# Patient Record
Sex: FEMALE | Race: WHITE | Employment: UNEMPLOYED | URBAN - METROPOLITAN AREA
[De-identification: names, ages, dates, MRNs, and addresses within clinical notes are randomized per-mention and may not be internally consistent; named-entity substitution may affect disease eponyms.]

---

## 2022-12-06 ENCOUNTER — ANESTHESIA EVENT (OUTPATIENT)
Dept: SURGERY | Age: 57
End: 2022-12-06
Payer: SELF-PAY

## 2022-12-07 ENCOUNTER — HOSPITAL ENCOUNTER (OUTPATIENT)
Age: 57
Setting detail: OUTPATIENT SURGERY
Discharge: HOME | End: 2022-12-07
Attending: OTOLARYNGOLOGY | Admitting: OTOLARYNGOLOGY
Payer: SELF-PAY

## 2022-12-07 ENCOUNTER — ANESTHESIA (OUTPATIENT)
Dept: SURGERY | Age: 57
End: 2022-12-07
Payer: SELF-PAY

## 2022-12-07 VITALS
HEIGHT: 63 IN | WEIGHT: 152.56 LBS | BODY MASS INDEX: 27.03 KG/M2 | OXYGEN SATURATION: 92 % | HEART RATE: 84 BPM | TEMPERATURE: 97.9 F | SYSTOLIC BLOOD PRESSURE: 144 MMHG | DIASTOLIC BLOOD PRESSURE: 75 MMHG | RESPIRATION RATE: 14 BRPM

## 2022-12-07 PROCEDURE — 77030002966 HC SUT PDS J&J -A: Performed by: OTOLARYNGOLOGY

## 2022-12-07 PROCEDURE — 77030040361 HC SLV COMPR DVT MDII -B

## 2022-12-07 PROCEDURE — 74011000250 HC RX REV CODE- 250: Performed by: OTOLARYNGOLOGY

## 2022-12-07 PROCEDURE — 74011250636 HC RX REV CODE- 250/636: Performed by: OTOLARYNGOLOGY

## 2022-12-07 PROCEDURE — 77030013629 HC ELECTRD NDL STRY -B: Performed by: OTOLARYNGOLOGY

## 2022-12-07 PROCEDURE — 2709999900 HC NON-CHARGEABLE SUPPLY: Performed by: OTOLARYNGOLOGY

## 2022-12-07 PROCEDURE — 74011000272 HC RX REV CODE- 272: Performed by: OTOLARYNGOLOGY

## 2022-12-07 PROCEDURE — 74011000250 HC RX REV CODE- 250: Performed by: NURSE ANESTHETIST, CERTIFIED REGISTERED

## 2022-12-07 PROCEDURE — 77030002974 HC SUT PLN J&J -A: Performed by: OTOLARYNGOLOGY

## 2022-12-07 PROCEDURE — 77030040356 HC CORD BPLR FRCP COVD -A: Performed by: OTOLARYNGOLOGY

## 2022-12-07 PROCEDURE — 74011250636 HC RX REV CODE- 250/636: Performed by: NURSE ANESTHETIST, CERTIFIED REGISTERED

## 2022-12-07 PROCEDURE — 76010000133 HC OR TIME 3 TO 3.5 HR: Performed by: OTOLARYNGOLOGY

## 2022-12-07 PROCEDURE — 76060000037 HC ANESTHESIA 3 TO 3.5 HR: Performed by: OTOLARYNGOLOGY

## 2022-12-07 PROCEDURE — 77030008771 HC TU NG SALEM SUMP -A: Performed by: NURSE ANESTHETIST, CERTIFIED REGISTERED

## 2022-12-07 PROCEDURE — 74011250637 HC RX REV CODE- 250/637: Performed by: OTOLARYNGOLOGY

## 2022-12-07 PROCEDURE — 77030026438 HC STYL ET INTUB CARD -A: Performed by: NURSE ANESTHETIST, CERTIFIED REGISTERED

## 2022-12-07 PROCEDURE — 76210000016 HC OR PH I REC 1 TO 1.5 HR: Performed by: OTOLARYNGOLOGY

## 2022-12-07 PROCEDURE — 74011250636 HC RX REV CODE- 250/636: Performed by: ANESTHESIOLOGY

## 2022-12-07 PROCEDURE — 76210000020 HC REC RM PH II FIRST 0.5 HR: Performed by: OTOLARYNGOLOGY

## 2022-12-07 PROCEDURE — 77030008684 HC TU ET CUF COVD -B: Performed by: NURSE ANESTHETIST, CERTIFIED REGISTERED

## 2022-12-07 PROCEDURE — 77030040922 HC BLNKT HYPOTHRM STRY -A

## 2022-12-07 RX ORDER — EPHEDRINE SULFATE/0.9% NACL/PF 50 MG/5 ML
SYRINGE (ML) INTRAVENOUS AS NEEDED
Status: DISCONTINUED | OUTPATIENT
Start: 2022-12-07 | End: 2022-12-07 | Stop reason: HOSPADM

## 2022-12-07 RX ORDER — SODIUM CHLORIDE, SODIUM LACTATE, POTASSIUM CHLORIDE, CALCIUM CHLORIDE 600; 310; 30; 20 MG/100ML; MG/100ML; MG/100ML; MG/100ML
100 INJECTION, SOLUTION INTRAVENOUS CONTINUOUS
Status: DISCONTINUED | OUTPATIENT
Start: 2022-12-07 | End: 2022-12-07 | Stop reason: HOSPADM

## 2022-12-07 RX ORDER — ONDANSETRON 2 MG/ML
INJECTION INTRAMUSCULAR; INTRAVENOUS AS NEEDED
Status: DISCONTINUED | OUTPATIENT
Start: 2022-12-07 | End: 2022-12-07 | Stop reason: HOSPADM

## 2022-12-07 RX ORDER — SODIUM CHLORIDE, SODIUM LACTATE, POTASSIUM CHLORIDE, CALCIUM CHLORIDE 600; 310; 30; 20 MG/100ML; MG/100ML; MG/100ML; MG/100ML
125 INJECTION, SOLUTION INTRAVENOUS CONTINUOUS
Status: DISCONTINUED | OUTPATIENT
Start: 2022-12-07 | End: 2022-12-07 | Stop reason: HOSPADM

## 2022-12-07 RX ORDER — FLUMAZENIL 0.1 MG/ML
0.2 INJECTION INTRAVENOUS
Status: DISCONTINUED | OUTPATIENT
Start: 2022-12-07 | End: 2022-12-07 | Stop reason: HOSPADM

## 2022-12-07 RX ORDER — PROPOFOL 10 MG/ML
INJECTION, EMULSION INTRAVENOUS
Status: DISCONTINUED | OUTPATIENT
Start: 2022-12-07 | End: 2022-12-07 | Stop reason: HOSPADM

## 2022-12-07 RX ORDER — ASCORBIC ACID 500 MG
TABLET ORAL
COMMUNITY

## 2022-12-07 RX ORDER — OXYMETAZOLINE HCL 0.05 %
SPRAY, NON-AEROSOL (ML) NASAL AS NEEDED
Status: DISCONTINUED | OUTPATIENT
Start: 2022-12-07 | End: 2022-12-07 | Stop reason: HOSPADM

## 2022-12-07 RX ORDER — SCOLOPAMINE TRANSDERMAL SYSTEM 1 MG/1
1 PATCH, EXTENDED RELEASE TRANSDERMAL ONCE
Status: DISCONTINUED | OUTPATIENT
Start: 2022-12-07 | End: 2022-12-07 | Stop reason: HOSPADM

## 2022-12-07 RX ORDER — DEXAMETHASONE SODIUM PHOSPHATE 4 MG/ML
INJECTION, SOLUTION INTRA-ARTICULAR; INTRALESIONAL; INTRAMUSCULAR; INTRAVENOUS; SOFT TISSUE AS NEEDED
Status: DISCONTINUED | OUTPATIENT
Start: 2022-12-07 | End: 2022-12-07 | Stop reason: HOSPADM

## 2022-12-07 RX ORDER — DEXAMETHASONE SODIUM PHOSPHATE 4 MG/ML
10 INJECTION, SOLUTION INTRA-ARTICULAR; INTRALESIONAL; INTRAMUSCULAR; INTRAVENOUS; SOFT TISSUE ONCE
Status: DISCONTINUED | OUTPATIENT
Start: 2022-12-07 | End: 2022-12-07 | Stop reason: HOSPADM

## 2022-12-07 RX ORDER — ROCURONIUM BROMIDE 10 MG/ML
INJECTION, SOLUTION INTRAVENOUS AS NEEDED
Status: DISCONTINUED | OUTPATIENT
Start: 2022-12-07 | End: 2022-12-07 | Stop reason: HOSPADM

## 2022-12-07 RX ORDER — BACITRACIN ZINC 500 UNIT/G
OINTMENT (GRAM) TOPICAL AS NEEDED
Status: DISCONTINUED | OUTPATIENT
Start: 2022-12-07 | End: 2022-12-07 | Stop reason: HOSPADM

## 2022-12-07 RX ORDER — ONDANSETRON 2 MG/ML
4 INJECTION INTRAMUSCULAR; INTRAVENOUS AS NEEDED
Status: DISCONTINUED | OUTPATIENT
Start: 2022-12-07 | End: 2022-12-07 | Stop reason: HOSPADM

## 2022-12-07 RX ORDER — LIDOCAINE HYDROCHLORIDE 10 MG/ML
0.1 INJECTION, SOLUTION EPIDURAL; INFILTRATION; INTRACAUDAL; PERINEURAL AS NEEDED
Status: DISCONTINUED | OUTPATIENT
Start: 2022-12-07 | End: 2022-12-07 | Stop reason: HOSPADM

## 2022-12-07 RX ORDER — HYDROMORPHONE HYDROCHLORIDE 2 MG/ML
INJECTION, SOLUTION INTRAMUSCULAR; INTRAVENOUS; SUBCUTANEOUS AS NEEDED
Status: DISCONTINUED | OUTPATIENT
Start: 2022-12-07 | End: 2022-12-07 | Stop reason: HOSPADM

## 2022-12-07 RX ORDER — SODIUM CHLORIDE 0.9 % (FLUSH) 0.9 %
5-40 SYRINGE (ML) INJECTION EVERY 8 HOURS
Status: DISCONTINUED | OUTPATIENT
Start: 2022-12-07 | End: 2022-12-07 | Stop reason: HOSPADM

## 2022-12-07 RX ORDER — SODIUM CHLORIDE 0.9 % (FLUSH) 0.9 %
5-40 SYRINGE (ML) INJECTION AS NEEDED
Status: DISCONTINUED | OUTPATIENT
Start: 2022-12-07 | End: 2022-12-07 | Stop reason: HOSPADM

## 2022-12-07 RX ORDER — OXYMETAZOLINE HCL 0.05 %
2 SPRAY, NON-AEROSOL (ML) NASAL ONCE
Status: COMPLETED | OUTPATIENT
Start: 2022-12-07 | End: 2022-12-07

## 2022-12-07 RX ORDER — LIDOCAINE HYDROCHLORIDE AND EPINEPHRINE 20; 10 MG/ML; UG/ML
INJECTION, SOLUTION INFILTRATION; PERINEURAL AS NEEDED
Status: DISCONTINUED | OUTPATIENT
Start: 2022-12-07 | End: 2022-12-07 | Stop reason: HOSPADM

## 2022-12-07 RX ORDER — LIDOCAINE HYDROCHLORIDE 20 MG/ML
INJECTION, SOLUTION EPIDURAL; INFILTRATION; INTRACAUDAL; PERINEURAL AS NEEDED
Status: DISCONTINUED | OUTPATIENT
Start: 2022-12-07 | End: 2022-12-07 | Stop reason: HOSPADM

## 2022-12-07 RX ORDER — SUCCINYLCHOLINE CHLORIDE 20 MG/ML
INJECTION INTRAMUSCULAR; INTRAVENOUS AS NEEDED
Status: DISCONTINUED | OUTPATIENT
Start: 2022-12-07 | End: 2022-12-07 | Stop reason: HOSPADM

## 2022-12-07 RX ORDER — LISINOPRIL AND HYDROCHLOROTHIAZIDE 10; 12.5 MG/1; MG/1
1 TABLET ORAL DAILY
COMMUNITY

## 2022-12-07 RX ORDER — MIDAZOLAM HYDROCHLORIDE 1 MG/ML
INJECTION, SOLUTION INTRAMUSCULAR; INTRAVENOUS AS NEEDED
Status: DISCONTINUED | OUTPATIENT
Start: 2022-12-07 | End: 2022-12-07 | Stop reason: HOSPADM

## 2022-12-07 RX ORDER — NEOSTIGMINE METHYLSULFATE 1 MG/ML
INJECTION, SOLUTION INTRAVENOUS AS NEEDED
Status: DISCONTINUED | OUTPATIENT
Start: 2022-12-07 | End: 2022-12-07 | Stop reason: HOSPADM

## 2022-12-07 RX ORDER — HYDROMORPHONE HYDROCHLORIDE 1 MG/ML
.5-1 INJECTION, SOLUTION INTRAMUSCULAR; INTRAVENOUS; SUBCUTANEOUS
Status: DISCONTINUED | OUTPATIENT
Start: 2022-12-07 | End: 2022-12-07 | Stop reason: HOSPADM

## 2022-12-07 RX ORDER — PROPOFOL 10 MG/ML
INJECTION, EMULSION INTRAVENOUS AS NEEDED
Status: DISCONTINUED | OUTPATIENT
Start: 2022-12-07 | End: 2022-12-07 | Stop reason: HOSPADM

## 2022-12-07 RX ORDER — NALOXONE HYDROCHLORIDE 0.4 MG/ML
0.2 INJECTION, SOLUTION INTRAMUSCULAR; INTRAVENOUS; SUBCUTANEOUS
Status: DISCONTINUED | OUTPATIENT
Start: 2022-12-07 | End: 2022-12-07 | Stop reason: HOSPADM

## 2022-12-07 RX ORDER — ROSUVASTATIN CALCIUM 10 MG/1
10 TABLET, COATED ORAL
COMMUNITY

## 2022-12-07 RX ORDER — UREA 10 %
100 LOTION (ML) TOPICAL DAILY
COMMUNITY

## 2022-12-07 RX ORDER — GLYCOPYRROLATE 0.2 MG/ML
INJECTION INTRAMUSCULAR; INTRAVENOUS AS NEEDED
Status: DISCONTINUED | OUTPATIENT
Start: 2022-12-07 | End: 2022-12-07 | Stop reason: HOSPADM

## 2022-12-07 RX ADMIN — PROPOFOL 50 MG: 10 INJECTION, EMULSION INTRAVENOUS at 09:48

## 2022-12-07 RX ADMIN — ROCURONIUM BROMIDE 10 MG: 10 INJECTION INTRAVENOUS at 07:42

## 2022-12-07 RX ADMIN — GLYCOPYRROLATE 0.4 MG: 0.2 INJECTION INTRAMUSCULAR; INTRAVENOUS at 10:18

## 2022-12-07 RX ADMIN — DEXAMETHASONE SODIUM PHOSPHATE 10 MG: 4 INJECTION, SOLUTION INTRAMUSCULAR; INTRAVENOUS at 07:51

## 2022-12-07 RX ADMIN — PROPOFOL 150 MG: 10 INJECTION, EMULSION INTRAVENOUS at 07:37

## 2022-12-07 RX ADMIN — HYDROMORPHONE HYDROCHLORIDE 1 MG: 2 INJECTION, SOLUTION INTRAMUSCULAR; INTRAVENOUS; SUBCUTANEOUS at 07:27

## 2022-12-07 RX ADMIN — SODIUM CHLORIDE, POTASSIUM CHLORIDE, SODIUM LACTATE AND CALCIUM CHLORIDE 125 ML/HR: 600; 310; 30; 20 INJECTION, SOLUTION INTRAVENOUS at 07:08

## 2022-12-07 RX ADMIN — CEFAZOLIN SODIUM 2 G: 1 POWDER, FOR SOLUTION INTRAMUSCULAR; INTRAVENOUS at 07:51

## 2022-12-07 RX ADMIN — ROCURONIUM BROMIDE 10 MG: 10 INJECTION INTRAVENOUS at 08:31

## 2022-12-07 RX ADMIN — PROPOFOL 100 MG: 10 INJECTION, EMULSION INTRAVENOUS at 08:31

## 2022-12-07 RX ADMIN — ROCURONIUM BROMIDE 10 MG: 10 INJECTION INTRAVENOUS at 09:48

## 2022-12-07 RX ADMIN — PROPOFOL 175 MCG/KG/MIN: 10 INJECTION, EMULSION INTRAVENOUS at 07:39

## 2022-12-07 RX ADMIN — Medication 5 MG: at 08:50

## 2022-12-07 RX ADMIN — Medication 2 SPRAY: at 07:10

## 2022-12-07 RX ADMIN — LIDOCAINE HYDROCHLORIDE 60 MG: 20 INJECTION, SOLUTION EPIDURAL; INFILTRATION; INTRACAUDAL; PERINEURAL at 07:37

## 2022-12-07 RX ADMIN — MIDAZOLAM HYDROCHLORIDE 2 MG: 1 INJECTION, SOLUTION INTRAMUSCULAR; INTRAVENOUS at 07:27

## 2022-12-07 RX ADMIN — ROCURONIUM BROMIDE 15 MG: 10 INJECTION INTRAVENOUS at 07:51

## 2022-12-07 RX ADMIN — SUCCINYLCHOLINE CHLORIDE 100 MG: 20 INJECTION, SOLUTION INTRAMUSCULAR; INTRAVENOUS at 07:37

## 2022-12-07 RX ADMIN — ONDANSETRON HYDROCHLORIDE 4 MG: 2 SOLUTION INTRAMUSCULAR; INTRAVENOUS at 07:54

## 2022-12-07 RX ADMIN — NEOSTIGMINE METHYLSULFATE 3 MG: 1 INJECTION, SOLUTION INTRAVENOUS at 10:18

## 2022-12-07 RX ADMIN — ROCURONIUM BROMIDE 5 MG: 10 INJECTION INTRAVENOUS at 07:37

## 2022-12-07 NOTE — ANESTHESIA PREPROCEDURE EVALUATION
Relevant Problems   No relevant active problems       Anesthetic History   No history of anesthetic complications            Review of Systems / Medical History  Patient summary reviewed and pertinent labs reviewed    Pulmonary  Within defined limits                 Neuro/Psych         Psychiatric history     Cardiovascular    Hypertension: well controlled          Hyperlipidemia    Exercise tolerance: >4 METS     GI/Hepatic/Renal  Within defined limits              Endo/Other  Within defined limits           Other Findings              Physical Exam    Airway  Mallampati: II  TM Distance: 4 - 6 cm  Neck ROM: normal range of motion   Mouth opening: Normal     Cardiovascular    Rhythm: regular  Rate: normal         Dental    Dentition: Upper dentition intact and Lower dentition intact     Pulmonary  Breath sounds clear to auscultation               Abdominal         Other Findings            Anesthetic Plan    ASA: 2  Anesthesia type: general          Induction: Intravenous  Anesthetic plan and risks discussed with: Patient

## 2022-12-07 NOTE — H&P
Otolaryngology, Head and Neck Surgery    Chief Complaint:    History of Present Illness:   Patient is a 59 y.o. female who is being seen for rhinoplasty. No h/o nasal surgery. C/o tip roundness and alar base width. Past Medical History:   Diagnosis Date    Depression     Hypercholesterolemia       History reviewed. No pertinent family history. Social History     Tobacco Use    Smoking status: Not on file    Smokeless tobacco: Not on file   Substance Use Topics    Alcohol use: Not on file     Past Surgical History:   Procedure Laterality Date    HX MASTOPEXY (BREAST LIFT) Bilateral     HX WISDOM TEETH EXTRACTION Bilateral       Current Facility-Administered Medications   Medication Dose Route Frequency    lidocaine (PF) (XYLOCAINE) 10 mg/mL (1 %) injection 0.1 mL  0.1 mL SubCUTAneous PRN    lactated Ringers infusion  100 mL/hr IntraVENous CONTINUOUS    sodium chloride (NS) flush 5-40 mL  5-40 mL IntraVENous Q8H    sodium chloride (NS) flush 5-40 mL  5-40 mL IntraVENous PRN    naloxone (NARCAN) injection 0.2 mg  0.2 mg IntraVENous Multiple    flumazeniL (ROMAZICON) 0.1 mg/mL injection 0.2 mg  0.2 mg IntraVENous Multiple    lactated Ringers infusion  125 mL/hr IntraVENous CONTINUOUS    lidocaine (PF) (XYLOCAINE) 10 mg/mL (1 %) injection 0.1 mL  0.1 mL SubCUTAneous PRN    ceFAZolin (ANCEF) 2 g in sterile water (preservative free) 20 mL IV syringe  2 g IntraVENous ONCE    dexamethasone (DECADRON) 4 mg/mL injection 10 mg  10 mg IntraVENous ONCE    scopolamine (TRANSDERM-SCOP) 1 mg over 3 days 1 Patch  1 Patch TransDERmal ONCE      Allergies   Allergen Reactions    Erythromycin Hives        Review of Systems:  Pertinent items are noted in the History of Present Illness.     Objective:     Patient Vitals for the past 8 hrs:   BP Temp Pulse Resp SpO2 Height Weight   22 0639 (!) 145/78 98.5 °F (36.9 °C) 62 16 96 % 5' 3\" (1.6 m) 69.2 kg (152 lb 8.9 oz)     Temp (24hrs), Av.5 °F (36.9 °C), Min:98.5 °F (36.9 °C), Max:98.5 °F (36.9 °C)    No intake/output data recorded. PHYSICAL EXAM:    CONSTITUTIONAL:  Well nourished individual in no acute distress. The patient is able to communicate with normal voice quality. HEAD AND NECK EXAM:    HEAD & FACE: No head or facial abnormalities present. No masses or lesions present. Overall appearance is normal. Facial strength is normal.  EYES: Conjunctiva normal.  No abnormalities of the lids or globes. Extraocular movements intact and conjugate. EARS: No significant abnormality of the external ear. NOSE: No significant external nasal lesions. No turbinate hypertrophy or mucosal lesions. No polyps, masses or purulence seen anteriorly. No edema. No significant septal deviation. SINUSES: The paranasal sinus regions are non-tender to palpation. ORAL CAVITY/OROPHARYNX: Lips and gums are without lesions. Oral cavity and oropharynx are without mucosal lesions or abnormalities. Tonsillar fossae, palate, tongue and uvula without abnormality. No edema. No erythema. NECK: No palpable lymphadenopathy or other masses in the neck. The trachea and larynx are midline. No thyroid enlargement or nodules appreciated. No abnormality of the parotid or submandibular glands present. LYMPHATIC: No lymphadenopathy in the neck/head. CHEST:  CTA  HEART:  RRR  NEUROLOGIC/PSYCHIATRIC:    NEUROLOGIC:  Cranial nerves 3, 4, 5, 6, 7, 10 (soft palate elevation), 11 and 12 bilaterally intact and symmetric. ORIENTATION/MOOD/AFFECT: Oriented to person, place, time and general circumstances. Mood and affect appropriate. Assessment:     Nasal disproportion    Plan:     Ready to proceed with rhinoplasty. Questions answered. Consent signed. Signed By: David Ramirez MD     December 7, 2022       [facility] HospitalDaniela Scanlon MD, [facility], [address] [facility] Facial [facility] Surgery  [email or web address]  [email or web address]  [address] [place] [address], [address] [place] [address], [address] [facility]  Ph:  [phone]  Fax: [phone]

## 2022-12-07 NOTE — DISCHARGE INSTRUCTIONS
Patient Discharge Instructions    Naveed Sababrenda Blackburn / [id number] : 1965    Admitted 2022 Discharged: 2022            Rhinoplasty Postoperative Instructions        The following instructions have been designed to answer practically every question that might arise regarding the \"do's\" and \"don'ts\" after rhinoplasty surgery. You and your family should read these several times to become familiar with them. Follow them faithfully because those who do generally have the smoothest postoperative course. SWELLING    Every operation, no matter how minor, is accompanied by swelling and sometimes bruising of the surrounding tissues. The amount varies from person to person. The swelling and bruising itself is not serious and is to be expected after your surgery. It sometimes is worse on the second postoperative day than it was on the first, and in the mornings. Remember that swelling and bruising will always subside eventually. You can help decrease the swelling in the following ways: Ice the nose for the first 48-72 hours after surgery. Ice placed inside a glove, frozen peas, or cold gel compresses can be used 20 minutes on, 20 minutes off while awake. Placing gauze over the skin under the ice can make it more comfortable. Avoid getting the tape dressing wet. Sleep with your head elevated for at least 1 week after surgery. Keep the head above the heart is sufficient to encourage drainage of swelling away from the nose. Avoid staying in bed after surgery. Moving around / light activity encourages circulation. Avoid bending over or lifting heavy things for one week. Besides aggravating the swelling, this may raise your blood pressure and cause bleeding. Avoid hitting or bumping your new nose. It is wise not to  small children. Avoid exposure to excessive sun during the 6 weeks after your operation. A sunscreen is always advisable regardless.   Use of sunscreen containing zinc or titanium oxide is encouraged. Do not tweeze your eyebrows for one week. This could cause infection. Avoid \"sniffing\", that is, constantly attempting to pull air through the nose as some people do when their nose feels blocked. This will not relieve the sensation of blockage - it will only aggravate it because the suction created on the inside will cause more swelling. However, gently breathing through the nose is fine and encouraged. Avoid rubbing the nostrils and the base of the nose with tissues or a handkerchief. Not only will this aggravate the swelling, but also it may cause infection, bleeding, or the accumulation of scar tissue inside the nose. Use the \"moustache\" gauze dressing instead if discharge is excessive. DISCOLORATION    It is not unusual to have varying amounts of bruising/discoloration in the cheeks and even beyond the face. Like swelling, the discoloration may become more pronounced a few days after surgery. It usually lasts not more than a week or two, all the while decreasing in intensity. If the nasal bones were not reshaped, there is usually very little bruising. Early use of icing/cold compresses is certainly recommended. Use of Arnica or Bromelain supplement before and after surgery (usually provided by the office) can help prevent and reduce swelling and bruising. Arnica gel or cream can be applied directly to bruises under the eyes as well. NUMBNESS    After surgery you will notice that the tip of your nose feels firm, and it is not uncommon for the nose to feel numb for a short time. Incisions inside your nose may feel slightly irregular on their surface until all swelling disappears. NASAL PACKING AND BLEEDING    The nose is not typically packed after surgery. Therefore, it is not uncommon to soak several gauze pads (your moustache dressing) during the first several hours after surgery. The frequency with which these are changed should decrease. Change the drip pad as needed using 4x4 gauze and tape. Whenever the nasal passages are blocked, such as when you have a cold or an allergy, the nasal glands produce more mucous than normal.  Your nose will be partially blocked from the postoperative swelling, so you can expect an increase in mucous drainage for several days. It may be blood tinged, which is expected. If a turbinate resection was part of your nasal procedure, bleeding can occur from this area for up to six weeks after your surgery. Be diligent in using the nasal saline spray and ointment. This helps the healing process and the dissolving of the crusts that form on the turbinates. PAIN    There is usually little actual pain following nasal surgery. You will be prescribed pain medication to take as needed. If the pain is mild, Tylenol can be used alone. Watch your total dose of Tylenol since the prescription pain medication typically contains Tylenol (Acetaminophen). The prescribed pain medication often causes sensations of light-headedness, particularly in the immediate postoperative period. Please take the pain medicine as needed. Do not try to \"tough it out\" if you are uncomfortable. DO NOT take aspirin, Motrin (ibuprofen), or any other NSAIDs (Non-Steroidal Anti-Inflammatory Drugs). This can increase your chance of bleeding and bruising. NAUSEA    Sometimes the anesthesia, the pain pills, or swallowed blood will make you nauseated. Nausea medication that dissolves under the tongue will usually be prescribed before surgery. DEPRESSION    It is not unusual for patients to go through a period of mild depression after surgery. Even though you very much want this surgery, and even though we have tried to tell you what to expect postoperatively, you may be somewhat shocked at seeing your own face swollen and bruised. This is a temporary condition which will subside shortly.   The best thing to do is to busy yourself with the details of your postoperative care and try to remember that the recovery period will soon be over. INSOMNIA    You may experience some difficulty falling asleep. If this becomes a significant issue, contact the office. We can prescribe sleeping medication if needed. KEEPING A STIFF UPPER LIP    The upper lip is important in nasal surgery, as much work is done in this area. To keep the healing tissues from being disturbed, avoid excessively moving your upper lip for as long as the bandage is in place. Avoid excessive smiling or pursing the lips such as kissing for ten days. Do not pull the upper lip down as women do when applying lipstick. Apply lipstick with a brush. Be careful with a toothbrush to avoid moving the lip excessively. Avoid gum or foods that are hard to chew. Soft foods may be preferrable for the first week. Take small bites to avoid opening the mouth widely. CLEANING THE NOSE    Don't blow the nose at all for ten days. After that, blow through both sides at once. Do not compress one side. You may clean the outside of the nose and the upper lip with cotton tipped applicators (Q-tips) moistened with warm water or dilute hydrogen peroxide (mix 50/50 with warm water). You can do this as soon as you return from the hospital, but do not rub the nose very hard. CLEANING THE NOSE    Saline (salt water) nose spray and an antibiotic ointment are prescribed to keep the nose moist and open. This will prevent any crusts from forming. Start using these the night of surgery and continue them frequently. The nasal saline spray should be used every few hours while awake, and the ointment should be applied 3-4 times/day. The outside and just inside of the nostrils may need to be cleaned with a Q-tip moistened with warm water or dilute hydrogen peroxide if crusting is present.   The antibiotic ointment that you will be prescribed, usually Bacitracin, should be applied to the inside of the nose with a Q-tip. Twist the Q-tip around inside gently; you can go in about as deep as the cotton part or until you feel any resistance. This will help prevent crusting and help you to breathe better. This should be done at least 3 to 4 times a day. You were also prescribed a saline spray. Using pressurized, misting saline spray, such as Simply Saline, is preferred. Be sure to avoid hypertonic saline spray. This can actually dry the nose more. Soon after the bandage has been removed, the skin on the nose should be cleaned gently in your usual manner twice a day, to remove the oily material that is produced by the skin glands. This will also assist in the reduction of swelling. DRYNESS OF THE LIPS    If your lips become dry from breathing through your mouth, lip balm or Vaseline may be used. A humidifier with plain water by the bedside at night might be a helpful addition. TEMPERATURE    Generally, the body temperature does not rise much above 100 degrees following nasal surgery. This rise usually occurs from slight dehydration so remember to drink plenty of fluids. Report any persistent temperature above 100 degrees. WEAKNESS    It is not unusual for a person who has had an anesthetic or any type of operation to feel weak or dizzy. This gradually clears up in a few days without medication. MEDICATION    Our office will usually prescribe all your prescriptions to be picked up before your surgery. If not, please contact the office. Almost all patients will be prescribed an antibiotic, pain medication, nausea medication, saline spray, and antibiotic ointment to be taken after surgery. Multivitamins with vitamin C are suggested for the pre- and postoperative periods and can be obtained by you without a prescription. The office will usually provide you with Arnica and Bromelain supplements to start before surgery.   These supplements can help prevent/reduce inflammation and bruising. If you develop a rash or other reaction while you are taking one of the medicines, this could mean that you are developing an allergy to the medicine. If this occurs, please stop taking your medications and call the office immediately. YOUR FIRST POSTOPERATIVE OFFICE VISIT    The appointment for your first postop visit should be made prior to surgery. This appointment will most probably be for the day after surgery. Sometimes, the first postoperative visit is made for 5-6 days after surgery. Please call the office if you have any questions before your postoperative visit. POSTOPERATIVE CARE    Following your surgery, we will want to see you in the office at regularly scheduled intervals to monitor your progress. RESUMING ACTIVITIES    While the bandage is in place, avoid wearing any pull over clothing. You should AVOID STRENUOUS ATHLETIC ACTIVITY FOR 3 WEEKS, including jogging, swimming, aerobics, weightlifting, etc.  After 3 weeks, ease back into exercise slowly over the following week. Avoid contact sports for four months. Avoid sneezing until the bandage is removed. If you must sneeze, let it come out like a cough - through the mouth. If it becomes a real problem, we will prescribe medication to alleviate the condition. Eyeglasses may be worn as long as the metal splint remains on the nose. After the splint is removed, glasses must be avoided or otherwise suspended from the forehead for a period of about six weeks. If this is not done, the pressure of your glasses may change the contour of your nose. Your glasses can be suspended from the nose after your splint is removed in three ways. One way is to use a piece of tape to hold the glasses on your forehead so that the weight is off your nose. Another alternative is a \"noseguard (Search [employer] Pro-Nose Guard, For [employer] on [address]).    We can provide you with further information about any of these alternatives, and our nurses will be happy to discuss these with you to help you choose the option that best fits your needs. Contact lenses may be worn the day after surgery. RETURNING TO WORK OR SCHOOL    The average patient can return to work or school the day after the bandage is removed. That will be about five to seven days after your surgery. Returning to work depends on several factors: the amount of physical activity involved in your position, the amount of public interaction your job requires, and the amount of swelling and discoloration that you may develop. Some patients may feel more comfortable staying home from work for 10-14 days after surgery. INJURY TO THE NOSE    Some individuals sustain accidents during the early postoperative period. You need not be too concerned unless the blow is hard enough to cause significant bleeding, swelling, or pain. If a blow is sustained while the metal splint is still on, this should help protect the nose. However, for the first five weeks after the nasal splint is removed, more attention should be paid to any injury to the nose. Blows to the nose can cause the nasal bones to become deviated. Please report any accident to the office immediately if you feel it was a significant bump. Otherwise, let us know about it at your next visit. FINALLY    Remember the things you were told before your operation:    When the bandage is first removed, your nose will appear swollen and may appear turned up too much. This is caused by the operative swelling over the nose and in the upper lip. The swelling will subside to a great extent during the next week. However, remember that it will take up to one or two years for all the swelling to disappear and for your nose to reach its final contour. The discoloration will gradually disappear over a period of seven to ten days, in most cases.   The thicker and oilier the skin, and the more significant the surgical maneuvers required, the longer the swelling will take to subside. The upper lip may seem stiff for some time after surgery, and you may feel that this interferes with your smile. Be patient. This will typically disappear within a few weeks. The tip of the nose sometimes feels numb after nasal surgery. This will eventually disappear. The upper teeth and palate may tingle or be partially numb as well. This, too, will resolve with time. Follow-up with Meeta Carpenter MD Tuesday 12/13/22 2:15pm    If you have any questions, please call us at [phone]. We are always happy to answer your questions, and if you should have a problem, this number is answered 24 hours a day. Sarah Mclean DISCHARGE SUMMARY from your Nurse      PATIENT INSTRUCTIONS    After general anesthesia or intravenous sedation, for 24 hours or while taking prescription Narcotics:  Limit your activities  Do not drive and operate hazardous machinery  Do not make important personal or business decisions  Do  not drink alcoholic beverages  If you have not urinated within 8 hours after discharge, please contact your surgeon on call. Report the following to your surgeon:  Excessive pain, swelling, redness or odor of or around the surgical area  Temperature over 100.5  Nausea and vomiting lasting longer than 4 hours or if unable to take medications  Any signs of decreased circulation or nerve impairment to extremity: change in color, persistent  numbness, tingling, coldness or increase pain  Any questions      GOOD HELP TO FIGHT AN INFECTION  Here are a few tip to help reduce the chance of getting an infection after surgery:  Wash Your Hands  Good handwashing is the most important thing you and your caregiver can do. Wash before and after caring for any wounds. Dry your hand with a clean towel. Wash with soap and water for at least 20 seconds.  A TIP: sing the \"Happy Birthday\" song through one time while washing to help with the timing. Use a hand  in between washings. Shower  When your surgeon says it is OK to take a shower, use a new bar of antibacterial soap (if that is what you use, and keep that bar of soap ONLY for your use), or antibacterial body wash. Use a clean wash cloth or sponge when you bathe. Dry off with a clean towel  after every bath - be careful around any wounds, skin staples, sutures or surgical glue over/on wounds. Do not enter swimming pools, hot tubs, lakes, rivers and/or ocean until wounds are healed and your doctor/surgeon says it is OK. Use Clean Sheets  Sleep on freshly laundered sheets after your surgery. Keep the surgery site covered with a clean, dry bandage (if instructed to do so). If the bandage becomes soiled, reapply a new, dry, clean bandage. Do not allow pets to sleep with you while your wound is healing. Lifestyle Modification and Controlling Your Blood Sugar  Smoking slows wound healing. Stop smoking and limit exposure to second-hand smoke. High blood sugar slows wound healing. Eat a well-balanced diet to provide proper nutrition while healing  Monitor your blood sugar (if you are a diabetic) and take your medications as you are suppose to so you can control you blood sugar after surgery. COUGH AND DEEP BREATHE    Breathing deeply and coughing are very important exercises to do after surgery. Deep breathing and coughing open the little air tubes and air sacks in your lungs. You take deep breaths every day. You may not even notice - it is just something you do when you sigh or yawn. It is a natural exercise you do to keep these air passages open. After surgery, take deep breaths and cough, on purpose. DIRECTIONS:  Take 10 to 15 slow deep breaths every hour while awake. Breathe in deeply, and hold it for 2 seconds. Exhale slowly through puckered lips, like blowing up a balloon.   After every 4th or 5th deep breath, hug your pillow to your chest or belly and give a hard, deep cough. Yes, it will probably hurt. But doing this exercise is a very important part of healing after surgery. Take your pain medicine to help you do this exercise without too much pain. Coughing and deep breathing help prevent bronchitis and pneumonia after surgery. If you had chest or belly surgery, use a pillow as a \"hug toby\" and hold it tightly to your chest or belly when you cough. ANKLE PUMPS    Ankle pumps increase the circulation of oxygenated blood to your lower extremities and decrease your risk for circulation problems such as blood clots. They also stretch the muscles, tendons and ligaments in your foot and ankle, and prevent joint contracture in the ankle and foot, especially after surgeries on the legs. It is important to do ankle pump exercises regularly after surgery because immobility increases your risk for developing a blood clot. Your doctor may also have you take an Aspirin for the next few days as well. If your doctor did not ask you to take an Aspirin, consult with him before starting Aspirin therapy on your own. The exercise is quite simple. Slowly point your foot forward, feeling the muscles on the top of your lower leg stretch, and hold this position for 5 seconds. Next, pull your foot back toward you as far as possible, stretching the calf muscles, and hold that position for 5 seconds. Repeat with the other foot. Perform 10 repetitions every hour while awake for both ankles if possible (down and then up with the foot once is one repetition). You should feel gentle stretching of the muscles in your lower leg when doing this exercise. If you feel pain, or your range of motion is limited, don't push too hard. Only go the limit your joint and muscles will let you go.   If you have increasing pain, progressively worsening leg warmth or swelling, STOP the exercise and call your doctor. MEDICATION AND   SIDE EFFECT GUIDE    The [facility] MEDICATION AND SIDE EFFECT GUIDE was provided to the PATIENT AND CARE PROVIDER. Information provided includes instruction about drug purpose and common side effects for the following medications:   Pain meds, Nausea meds, antibiotics, antibiotic ointment, saline spray        These are general instructions for a healthy lifestyle:    *   Please give a list of your current medications to your Primary Care Provider. *   Please update this list whenever your medications are discontinued, doses are changed, or new medications (including over-the-counter products) are added. *   Please carry medication information at all times in case of emergency situations. About Smoking  No smoking / No tobacco products  Avoid exposure to second hand smoke     Surgeon General's Warning:  Quitting smoking now greatly reduces serious risk to your health. Obesity, smoking, and sedentary lifestyle greatly increases your risk for illness and disease. A healthy diet, regular physical exercise & weight monitoring are important for maintaining a healthy lifestyle. Congestive Heart Failure  You may be retaining fluid if you have a history of heart failure or if you experience any of the following symptoms:  Weight gain of 3 pounds or more overnight or 5 pounds in a week, increased swelling in your hands or feet or shortness of breath while lying flat in bed. Please call your doctor as soon as you notice any of these symptoms; do not wait until your next office visit. Recognize signs and symptoms of STROKE:  F -  Face looks uneven  A -  Arms unable to move or move evenly  S -  Speech slurred or non-existent  T -  Time-call 911 as soon as signs and symptoms begin-DO NOT go          back to bed or wait to see if you get better-TIME IS BRAIN. Warning Signs of HEART ATTACK   Call 911 if you have these symptoms:    Chest discomfort.  Most heart attacks involve discomfort in the center of the chest that lasts more than a few minutes, or that goes away and comes back. It can feel like uncomfortable pressure, squeezing, fullness, or pain. Discomfort in other areas of the upper body. Symptoms can include pain or discomfort in one or both arms, the back, neck, jaw, or stomach. Shortness of breath with or without chest discomfort. Other signs may include breaking out in a cold sweat, nausea, or lightheadedness. Don't wait more than five minutes to call 911 - MINUTES MATTER! Fast action can save your life. Calling 911 is almost always the fastest way to get lifesaving treatment. Emergency Medical Services staff can begin treatment when they arrive -- up to an hour sooner than if someone gets to the hospital by car. Learning About Coronavirus [phone])  Coronavirus [phone]): Overview  What is coronavirus (COVID-19)? The coronavirus disease (COVID-19) is caused by a virus. It is an illness that was first found in [place], [place], in December 2019. It has since spread worldwide. The virus can cause fever, cough, and trouble breathing. In severe cases, it can cause pneumonia and make it hard to breathe without help. It can cause death. Coronaviruses are a large group of viruses. They cause the common cold. They also cause more serious illnesses like [place] respiratory syndrome (MERS) and severe acute respiratory syndrome (SARS). COVID-19 is caused by a novel coronavirus. That means it's a new type that has not been seen in people before. This virus spreads person-to-person through droplets from coughing and sneezing. It can also spread when you are close to someone who is infected. And it can spread when you touch something that has the virus on it, such as a doorknob or a tabletop. What can you do to protect yourself from coronavirus (COVID-19)? The best way to protect yourself from getting sick is to:   Avoid areas where there is an outbreak. Avoid contact with people who may be infected. Wash your hands often with soap or alcohol-based hand sanitizers. Avoid crowds and try to stay at least 6 feet away from other people. Wash your hands often, especially after you cough or sneeze. Use soap and water, and scrub for at least 20 seconds. If soap and water aren't available, use an alcohol-based hand . Avoid touching your mouth, nose, and eyes. What can you do to avoid spreading the virus to others? To help avoid spreading the virus to others:  Cover your mouth with a tissue when you cough or sneeze. Then throw the tissue in the trash. Use a disinfectant to clean things that you touch often. Stay home if you are sick or have been exposed to the virus. Don't go to school, work, or public areas. And don't use public transportation. If you are sick:  Leave your home only if you need to get medical care. But call the doctor's office first so they know you're coming. And wear a face mask, if you have one. If you have a face mask, wear it whenever you're around other people. It can help stop the spread of the virus when you cough or sneeze. Clean and disinfect your home every day. Use household  and disinfectant wipes or sprays. Take special care to clean things that you grab with your hands. These include doorknobs, remote controls, phones, and handles on your refrigerator and microwave. And don't forget countertops, tabletops, bathrooms, and computer keyboards. When to call for help  Call 911 anytime you think you may need emergency care. For example, call if:  You have severe trouble breathing. (You can't talk at all.)  You have constant chest pain or pressure. You are severely dizzy or lightheaded. You are confused or can't think clearly. Your face and lips have a blue color. You pass out (lose consciousness) or are very hard to wake up.   Call your doctor now if you develop symptoms such as:  Shortness of breath. Fever. Cough. If you need to get care, call ahead to the doctor's office for instructions before you go. Make sure you wear a face mask, if you have one, to prevent exposing other people to the virus. Where can you get the latest information? The following health organizations are tracking and studying this virus. Their websites contain the most up-to-date information. Harlen Severin also learn what to do if you think you may have been exposed to the virus. [place] Centers for Disease Control and Prevention (CDC): The CDC provides updated news about the disease and travel advice. The website also tells you how to prevent the spread of infection. www.cdc.gov  World Health Organization [facility]): WHO offers information about the virus outbreaks. WHO also has travel advice. www.who.int  Current as of: April 1, 2020               Content Version: 12.4  © 5347-4121 [employer], [employer]orporated. Care instructions adapted under license by your healthcare professional. If you have questions about a medical condition or this instruction, always ask your healthcare professional. [place] [address] any warranty or liability for your use of this information. The discharge information has been reviewed with the friend. Any questions and concerns from the friend have been addressed. The friend verbalized understanding.         CONTENTS FOUND IN YOUR DISCHARGE ENVELOPE:  [x]     Surgeon and Hospital Discharge Instructions  [x]     [facility] Care Provider Card  [x]     Medication & Side Effect Guide            (your newly prescribed medications have been marked/highlighted showing the most common side effects from   the classes of drugs on your prescriptions)  [x]     Medication Prescription(s) ( [x] These have been sent electronically to your pharmacy by your surgeon,   - OR -       your surgeon has already provided these to you during a previous/pre-op office visit)  []     [address] Education Information  []     Physical Therapy Prescription  []     Follow-up Appointment Cards  []     Surgery-related Pictures/Media  []     Pain block and/or block with On-Q Catheter from Anesthesia Service (information included in your instructions above)  []     Medical device information sheets/pamphlets from their    []     School/work excuse note. []     /parent work excuse note. The following personal items collected during your admission are returned to you:   Dental Appliance: Dental Appliances: None  Vision:    Hearing Aid:    Jewelry: Jewelry: None  Clothing: Clothing: Socks, Shirt, Pants (to PACU locker)  Other Valuables:  Other Valuables: Cell Phone, Eyeglasses (PACU desk)  Valuables sent to safe:

## 2022-12-07 NOTE — ANESTHESIA POSTPROCEDURE EVALUATION
Procedure(s):  ELECTIVE COSMETIC RHINOPLASTY (ENDOTRACHEAL TUBE). general    Anesthesia Post Evaluation      Multimodal analgesia: multimodal analgesia used between 6 hours prior to anesthesia start to PACU discharge  Patient location during evaluation: bedside  Patient participation: complete - patient participated  Level of consciousness: awake  Pain management: adequate  Airway patency: patent  Anesthetic complications: no  Cardiovascular status: acceptable  Respiratory status: acceptable  Hydration status: acceptable        INITIAL Post-op Vital signs:   Vitals Value Taken Time   /110 12/07/22 1150   Temp 36.7 °C (98 °F) 12/07/22 1034   Pulse 87 12/07/22 1153   Resp 16 12/07/22 1153   SpO2 92 % 12/07/22 1153   Vitals shown include unvalidated device data.

## 2022-12-08 NOTE — OP NOTES
[facility] 77  OPERATIVE REPORT    Name:  Madeleine Delvalle  MR#:  [id number]  :  1965  ACCOUNT #:  [de-identified]  DATE OF SERVICE:  2022    PREOPERATIVE DIAGNOSIS:  Nasal disproportion. POSTOPERATIVE DIAGNOSIS:  Nasal disproportion. PROCEDURE PERFORMED:  Cosmetic intranasal rhinoplasty. SURGEON:  Reyes Newborn. [facility], MD    ASSISTANT:  none    ANESTHESIA:  General.    COMPLICATIONS:  None. SPECIMENS REMOVED:  None. IMPLANTS:  none. ESTIMATED BLOOD LOSS:  Minimal.    FINDINGS:  There was cephalic orientation of the lower lateral cartilages with convex domes giving the nose a bulbous appearance. There were shadows between the tip and the alar lobule on both sides. The alar base was somewhat wide. The dorsum was flat with no dorsal hump. The septum was reasonably straight. INDICATIONS FOR PROCEDURE:  The patient is a 20-year-old female, who does not like the cosmetic appearance of her nose. She has no history of nasal surgery. PROCEDURE IN DETAIL:  After informed consent, the patient was taken to the operating room and placed on the table in supine position. After general anesthesia, the table was turned to 180 degrees. The nose was packed with Afrin pledgets and injected with 1% lidocaine with 1:100,000 epinephrine. The patient was prepped and draped in standard fashion. Initially, bilateral intercartilaginous incisions were made connected to a hemitransfixion on the left side and only approximately 3 mm hemitransfixion on the right. These did not communicate. Next, bilateral marginal incisions were made in this case. The lateral portion was placed in an orthotopic position in preparation for grafting. Scissors were used to spread down to the bare cartilage and the skin and soft tissue envelope was elevated off the cartilage on both sides. The domes were delivered into each nostril and examined.   There was quite a bit of buckling at the domes without a defined tip. So, in this case, a cephalic trim was performed about 3 mm leaving the mucosa underneath intact. This freed up some of the buckling and then a 5-0 PDS suture was used to place dome defining sutures with a little bit of lateral crural steal to increase the intrinsic tip projection. This was done symmetrically on both sides. Next, the left dome was delivered through the right nostril and placed together with the other dome. A columella strut was then harvested from the septum by elevating mucoperichondrial flap on the left and then leaving about a 2-cm caudal and dorsal strut. The cartilage was harvested for grafting purposes. There was no bony deviation, so bone was not removed in this case. A columella strut graft was then curved. This was about 2 cm long. It was placed between the medial crura into an abbreviated pocket just below the edge of the dissection. This did not extend to the medial crural footplates in this case. The medial crura was initially sutured to the strut with a mattress 5-0 PDS suture with a buried knot. Next, a dome equilization suture was with 5-0 PDS placed in the tip and the upper part of the domes in a mattress fashion leaving some divergence anteriorly. Further suture was placed at the top of the graft. The graft was also trimmed and then another suture was placed at the top of the PDS at the columellar breakpoint to emphasize this shape. Next, a small crushed cartilage graft was placed into the upper part of the tip just to camouflage and also add just a bit more supratip break in this case. The dome was irrigated and placed back under the skin and the shape improvement was dramatic at this point with a nice supratip break. Next, lateral strut grafts were carved from harvested cartilage. These were about 2 cm long x about 3-4 mm wide.   They were tapered on the edges and they were placed directly over the lower lateral cartilage extending along the orthotopic marginal incision laterally. This was only done for about 4-5 mm lateral to the edge of the native lower lateral cartilage. This has helped to break up some of the shadow between the alar lobule and the tip. This was secured to the cartilage medially with a 6-0 PDS suture and then a 5-0 plain suture was used to close the lateral part of the incision incorporating the graft into the closure just to maintain its position. Rest of the incision was closed with 5-0 plain sutures and this was repeated in the same detail on the right side. The intracartilaginous and marginal incisions were then all closed with 5-0 plain sutures. The septal pocket was irrigated with antibiotic saline. All remaining cartilage was lightly crushed and placed back between the septal flaps to re-skeletonize the septum. The incision was closed with 5-0 plain sutures and then a 5-0 plain running mattress suture was placed to reappose the septal flaps incorporating the replaced cartilage. The alar base was then reduced by planning incision at the alar groove and then extending medially where a medial vertical buttress was  created and about 3 mm of sill, somewhat laterally in this case to remove width and flaring, was then tapered laterally into the other incision. The wedge was excised with a #15 blade and then the defect was closed with 5-0 Vicryl deep suture and then 5-0 nylon and 5-0 plain sutures for the skin. This was repeated in same detail on the opposite side. The nose was irrigated through the nasopharynx. A 4-0 plain suture on the Silvina needle was used to close any dead space at the posterior part of the columella about the footplates and then a further septal columellar suture was placed for a stable rotation purposes. The nose was irrigated and suctioned through the nasopharynx. No bleeding was seen.   Pledgets coated in bacitracin and soaked in Afrin were placed on both sides and the strings were taped to the face.  An external splint composed of brown tape and a metal splint was placed in the usual fashion. This concluded the procedure. The patient was awakened from anesthesia, extubated, and taken to the PACU in stable condition. There were no complications. The patient tolerated the procedure well.       MD HUNTER [id number]  D:  12/07/2022 10:36  T:  12/07/2022 22:25  JOB #:  [id number]

## 2023-12-08 ENCOUNTER — HOSPITAL ENCOUNTER (EMERGENCY)
Facility: HOSPITAL | Age: 58
Discharge: HOME | End: 2023-12-08
Attending: EMERGENCY MEDICINE
Payer: COMMERCIAL

## 2023-12-08 ENCOUNTER — APPOINTMENT (OUTPATIENT)
Facility: HOSPITAL | Age: 58
End: 2023-12-08
Payer: COMMERCIAL

## 2023-12-08 VITALS
HEIGHT: 63 IN | SYSTOLIC BLOOD PRESSURE: 137 MMHG | OXYGEN SATURATION: 98 % | HEART RATE: 64 BPM | WEIGHT: 151.68 LBS | RESPIRATION RATE: 20 BRPM | BODY MASS INDEX: 26.88 KG/M2 | TEMPERATURE: 98 F | DIASTOLIC BLOOD PRESSURE: 97 MMHG

## 2023-12-08 DIAGNOSIS — R10.31 RIGHT LOWER QUADRANT ABDOMINAL PAIN: ICD-10-CM

## 2023-12-08 DIAGNOSIS — R11.2 NAUSEA AND VOMITING, UNSPECIFIED VOMITING TYPE: Primary | ICD-10-CM

## 2023-12-08 LAB
ALBUMIN SERPL-MCNC: 4.9 G/DL (ref 3.5–5)
ALBUMIN/GLOB SERPL: 1.3 (ref 1.1–2.2)
ALP SERPL-CCNC: 72 U/L (ref 45–117)
ALT SERPL-CCNC: 34 U/L (ref 12–78)
ANION GAP SERPL CALC-SCNC: 5 MMOL/L (ref 5–15)
APPEARANCE UR: CLEAR
AST SERPL-CCNC: 24 U/L (ref 15–37)
BACTERIA URNS QL MICRO: NEGATIVE /HPF
BASOPHILS # BLD: 0 K/UL (ref 0–0.1)
BASOPHILS NFR BLD: 1 % (ref 0–1)
BILIRUB SERPL-MCNC: 0.6 MG/DL (ref 0.2–1)
BILIRUB UR QL: NEGATIVE
BUN SERPL-MCNC: 13 MG/DL (ref 6–20)
BUN/CREAT SERPL: 16 (ref 12–20)
CALCIUM SERPL-MCNC: 9.5 MG/DL (ref 8.5–10.1)
CHLORIDE SERPL-SCNC: 103 MMOL/L (ref 97–108)
CO2 SERPL-SCNC: 30 MMOL/L (ref 21–32)
COLOR UR: NORMAL
COMMENT:: NORMAL
CREAT SERPL-MCNC: 0.82 MG/DL (ref 0.55–1.02)
DIFFERENTIAL METHOD BLD: NORMAL
EOSINOPHIL # BLD: 0.1 K/UL (ref 0–0.4)
EOSINOPHIL NFR BLD: 2 % (ref 0–7)
EPITH CASTS URNS QL MICRO: NORMAL /LPF
ERYTHROCYTE [DISTWIDTH] IN BLOOD BY AUTOMATED COUNT: 11.8 % (ref 11.5–14.5)
GLOBULIN SER CALC-MCNC: 3.8 G/DL (ref 2–4)
GLUCOSE SERPL-MCNC: 97 MG/DL (ref 65–100)
GLUCOSE UR STRIP.AUTO-MCNC: NEGATIVE MG/DL
HCT VFR BLD AUTO: 41.5 % (ref 35–47)
HGB BLD-MCNC: 14.3 G/DL (ref 11.5–16)
HGB UR QL STRIP: NEGATIVE
HYALINE CASTS URNS QL MICRO: NORMAL /LPF (ref 0–5)
IMM GRANULOCYTES # BLD AUTO: 0 K/UL (ref 0–0.04)
IMM GRANULOCYTES NFR BLD AUTO: 0 % (ref 0–0.5)
KETONES UR QL STRIP.AUTO: NEGATIVE MG/DL
LEUKOCYTE ESTERASE UR QL STRIP.AUTO: NEGATIVE
LIPASE SERPL-CCNC: 44 U/L (ref 13–75)
LYMPHOCYTES # BLD: 2.2 K/UL (ref 0.8–3.5)
LYMPHOCYTES NFR BLD: 36 % (ref 12–49)
MCH RBC QN AUTO: 31.8 PG (ref 26–34)
MCHC RBC AUTO-ENTMCNC: 34.5 G/DL (ref 30–36.5)
MCV RBC AUTO: 92.2 FL (ref 80–99)
MONOCYTES # BLD: 0.3 K/UL (ref 0–1)
MONOCYTES NFR BLD: 6 % (ref 5–13)
NEUTS SEG # BLD: 3.3 K/UL (ref 1.8–8)
NEUTS SEG NFR BLD: 55 % (ref 32–75)
NITRITE UR QL STRIP.AUTO: NEGATIVE
NRBC # BLD: 0 K/UL (ref 0–0.01)
NRBC BLD-RTO: 0 PER 100 WBC
PH UR STRIP: 7.5 (ref 5–8)
PLATELET # BLD AUTO: 255 K/UL (ref 150–400)
PMV BLD AUTO: 9.6 FL (ref 8.9–12.9)
POTASSIUM SERPL-SCNC: 3.6 MMOL/L (ref 3.5–5.1)
PROT SERPL-MCNC: 8.7 G/DL (ref 6.4–8.2)
PROT UR STRIP-MCNC: NEGATIVE MG/DL
RBC # BLD AUTO: 4.5 M/UL (ref 3.8–5.2)
RBC #/AREA URNS HPF: NORMAL /HPF (ref 0–5)
SODIUM SERPL-SCNC: 138 MMOL/L (ref 136–145)
SP GR UR REFRACTOMETRY: 1.01 (ref 1–1.03)
SPECIMEN HOLD: NORMAL
URINE CULTURE IF INDICATED: NORMAL
UROBILINOGEN UR QL STRIP.AUTO: 0.2 EU/DL (ref 0.2–1)
WBC # BLD AUTO: 6 K/UL (ref 3.6–11)
WBC URNS QL MICRO: NORMAL /HPF (ref 0–4)

## 2023-12-08 PROCEDURE — 99285 EMERGENCY DEPT VISIT HI MDM: CPT

## 2023-12-08 PROCEDURE — 74177 CT ABD & PELVIS W/CONTRAST: CPT

## 2023-12-08 PROCEDURE — 6360000004 HC RX CONTRAST MEDICATION: Performed by: RADIOLOGY

## 2023-12-08 PROCEDURE — 36415 COLL VENOUS BLD VENIPUNCTURE: CPT

## 2023-12-08 PROCEDURE — 81001 URINALYSIS AUTO W/SCOPE: CPT

## 2023-12-08 PROCEDURE — 83690 ASSAY OF LIPASE: CPT

## 2023-12-08 PROCEDURE — 85025 COMPLETE CBC W/AUTO DIFF WBC: CPT

## 2023-12-08 PROCEDURE — 80053 COMPREHEN METABOLIC PANEL: CPT

## 2023-12-08 RX ORDER — ONDANSETRON 4 MG/1
4 TABLET, ORALLY DISINTEGRATING ORAL 3 TIMES DAILY PRN
Qty: 21 TABLET | Refills: 0 | Status: SHIPPED | OUTPATIENT
Start: 2023-12-08 | End: 2023-12-08 | Stop reason: SDUPTHER

## 2023-12-08 RX ORDER — ONDANSETRON 4 MG/1
4 TABLET, ORALLY DISINTEGRATING ORAL 3 TIMES DAILY PRN
Qty: 21 TABLET | Refills: 0 | Status: SHIPPED | OUTPATIENT
Start: 2023-12-08

## 2023-12-08 RX ADMIN — IOPAMIDOL 100 ML: 755 INJECTION, SOLUTION INTRAVENOUS at 15:12

## 2023-12-08 ASSESSMENT — PAIN DESCRIPTION - FREQUENCY: FREQUENCY: INTERMITTENT

## 2023-12-08 ASSESSMENT — PAIN SCALES - GENERAL: PAINLEVEL_OUTOF10: 1

## 2023-12-08 ASSESSMENT — PAIN DESCRIPTION - LOCATION: LOCATION: ABDOMEN

## 2023-12-08 ASSESSMENT — PAIN DESCRIPTION - ORIENTATION: ORIENTATION: RIGHT;LOWER

## 2023-12-08 ASSESSMENT — PAIN - FUNCTIONAL ASSESSMENT
PAIN_FUNCTIONAL_ASSESSMENT: 0-10
PAIN_FUNCTIONAL_ASSESSMENT: NONE - DENIES PAIN
PAIN_FUNCTIONAL_ASSESSMENT: ACTIVITIES ARE NOT PREVENTED

## 2023-12-08 ASSESSMENT — PAIN DESCRIPTION - PAIN TYPE: TYPE: ACUTE PAIN

## 2023-12-08 ASSESSMENT — PAIN DESCRIPTION - DESCRIPTORS: DESCRIPTORS: ACHING

## 2023-12-08 ASSESSMENT — PAIN DESCRIPTION - ONSET: ONSET: ON-GOING

## 2023-12-08 NOTE — ED TRIAGE NOTES
Patient is coming in with RLQ abdominal pain with nausea and vomiting for 1 month. Patient was on Zofran every night for 10 days and the vomiting stopped but last night was night 11 and stared vomiting again.

## 2025-02-11 ENCOUNTER — TRANSCRIBE ORDERS (OUTPATIENT)
Facility: HOSPITAL | Age: 60
End: 2025-02-11

## 2025-02-11 DIAGNOSIS — R11.11 INTRACTABLE VOMITING WITHOUT NAUSEA: Primary | ICD-10-CM

## 2025-02-14 ENCOUNTER — HOSPITAL ENCOUNTER (OUTPATIENT)
Facility: HOSPITAL | Age: 60
Discharge: HOME | End: 2025-02-17
Payer: COMMERCIAL

## 2025-02-14 DIAGNOSIS — R11.11 VOMITING WITHOUT NAUSEA, UNSPECIFIED VOMITING TYPE: ICD-10-CM

## 2025-02-14 PROCEDURE — 76705 ECHO EXAM OF ABDOMEN: CPT

## 2025-02-26 ENCOUNTER — HOSPITAL ENCOUNTER (OUTPATIENT)
Facility: HOSPITAL | Age: 60
Discharge: HOME | End: 2025-03-01
Payer: COMMERCIAL

## 2025-02-26 DIAGNOSIS — R11.11 INTRACTABLE VOMITING WITHOUT NAUSEA: ICD-10-CM

## 2025-02-26 PROCEDURE — 70450 CT HEAD/BRAIN W/O DYE: CPT

## (undated) DEVICE — SUTURE ABSORBABLE MONOFILAMENT 4-0 SC-1 18 IN PLN GUT 1824H

## (undated) DEVICE — SPONGE GZ W4XL4IN COT 12 PLY TYP VII WVN C FLD DSGN

## (undated) DEVICE — MICRODISSECTION NEEDLE STRAIGHT SLEEVE: Brand: COLORADO

## (undated) DEVICE — MASTISOL ADHESIVE LIQ 2/3ML

## (undated) DEVICE — BIPOLAR FORCEPS CORD: Brand: VALLEYLAB

## (undated) DEVICE — SOLUTION SCRB 2OZ 10% POVIDONE IOD ANTIMIC BTL

## (undated) DEVICE — MAGNETIC INSTRUMENT PAD 10" X 16"; MEDIUM; DISPOSABLE: Brand: CARDINAL HEALTH

## (undated) DEVICE — SUTURE PDS II SZ 5-0 L18IN ABSRB VLT P-3 L13MM 3/8 CIR REV Z463G

## (undated) DEVICE — MINOR ENT-SFMCASU: Brand: MEDLINE INDUSTRIES, INC.

## (undated) DEVICE — GLOVE ORANGE PI 7   MSG9070

## (undated) DEVICE — ROCKER SWITCH PENCIL BLADE ELECTRODE, HOLSTER: Brand: EDGE

## (undated) DEVICE — SUT PLN 5-0 18IN P3 YEL --

## (undated) DEVICE — Z INACTIVE USE 2735373 APPLICATOR FBR LAIN COT WOOD TIP ECONOMICAL

## (undated) DEVICE — INTENDED FOR TISSUE SEPARATION, AND OTHER PROCEDURES THAT REQUIRE A SHARP SURGICAL BLADE TO PUNCTURE OR CUT.: Brand: BARD-PARKER ® CARBON RIB-BACK BLADES

## (undated) DEVICE — SOL IRRIGATION INJ NACL 0.9% 500ML BTL